# Patient Record
Sex: MALE | Race: WHITE | Employment: UNEMPLOYED | URBAN - NONMETROPOLITAN AREA
[De-identification: names, ages, dates, MRNs, and addresses within clinical notes are randomized per-mention and may not be internally consistent; named-entity substitution may affect disease eponyms.]

---

## 2018-02-19 ENCOUNTER — HOSPITAL ENCOUNTER (EMERGENCY)
Age: 28
Discharge: HOME | End: 2018-02-19
Attending: FAMILY MEDICINE

## 2018-02-19 VITALS
WEIGHT: 260 LBS | RESPIRATION RATE: 16 BRPM | TEMPERATURE: 98.8 F | SYSTOLIC BLOOD PRESSURE: 126 MMHG | DIASTOLIC BLOOD PRESSURE: 80 MMHG | OXYGEN SATURATION: 98 % | HEART RATE: 75 BPM | BODY MASS INDEX: 36.26 KG/M2

## 2018-02-19 DIAGNOSIS — K52.9 ENTERITIS: Primary | ICD-10-CM

## 2018-02-19 DIAGNOSIS — R63.8 SYMPTOMS OF DEHYDRATION: ICD-10-CM

## 2018-02-19 LAB
-: ABNORMAL
ABSOLUTE EOS #: 0.2 K/UL (ref 0–0.4)
ABSOLUTE IMMATURE GRANULOCYTE: ABNORMAL K/UL (ref 0–0.3)
ABSOLUTE LYMPH #: 3.7 K/UL (ref 1–4.8)
ABSOLUTE MONO #: 0.4 K/UL (ref 0.2–0.8)
ALBUMIN SERPL-MCNC: 4.4 G/DL (ref 3.5–5.2)
ALBUMIN/GLOBULIN RATIO: 1.6 (ref 1–2.5)
ALP BLD-CCNC: 64 U/L (ref 40–129)
ALT SERPL-CCNC: 34 U/L (ref 5–41)
AMORPHOUS: ABNORMAL
ANION GAP SERPL CALCULATED.3IONS-SCNC: 9 MMOL/L (ref 9–17)
AST SERPL-CCNC: 21 U/L
BACTERIA: ABNORMAL
BASOPHILS # BLD: 1 % (ref 0–2)
BASOPHILS ABSOLUTE: 0.1 K/UL (ref 0–0.2)
BILIRUB SERPL-MCNC: 0.33 MG/DL (ref 0.3–1.2)
BILIRUBIN URINE: NEGATIVE
BUN BLDV-MCNC: 13 MG/DL (ref 6–20)
BUN/CREAT BLD: 14 (ref 9–20)
CALCIUM SERPL-MCNC: 9.3 MG/DL (ref 8.6–10.4)
CASTS UA: ABNORMAL /LPF
CHLORIDE BLD-SCNC: 103 MMOL/L (ref 98–107)
CO2: 28 MMOL/L (ref 20–31)
COLOR: YELLOW
COMMENT UA: ABNORMAL
CREAT SERPL-MCNC: 0.91 MG/DL (ref 0.7–1.2)
CRYSTALS, UA: ABNORMAL /HPF
DIFFERENTIAL TYPE: ABNORMAL
EOSINOPHILS RELATIVE PERCENT: 3 % (ref 1–4)
EPITHELIAL CELLS UA: ABNORMAL /HPF (ref 0–5)
GFR AFRICAN AMERICAN: >60 ML/MIN
GFR NON-AFRICAN AMERICAN: >60 ML/MIN
GFR SERPL CREATININE-BSD FRML MDRD: ABNORMAL ML/MIN/{1.73_M2}
GFR SERPL CREATININE-BSD FRML MDRD: ABNORMAL ML/MIN/{1.73_M2}
GLUCOSE BLD-MCNC: 106 MG/DL (ref 70–99)
GLUCOSE URINE: NEGATIVE
HCT VFR BLD CALC: 47.5 % (ref 41–53)
HEMOGLOBIN: 16.7 G/DL (ref 13.5–17)
IMMATURE GRANULOCYTES: ABNORMAL %
KETONES, URINE: NEGATIVE
LEUKOCYTE ESTERASE, URINE: NEGATIVE
LIPASE: 34 U/L (ref 13–60)
LYMPHOCYTES # BLD: 47 % (ref 24–44)
MCH RBC QN AUTO: 28.8 PG (ref 26–34)
MCHC RBC AUTO-ENTMCNC: 35.2 G/DL (ref 31–37)
MCV RBC AUTO: 81.8 FL (ref 80–100)
MONOCYTES # BLD: 5 % (ref 1–7)
MUCUS: ABNORMAL
NITRITE, URINE: NEGATIVE
NRBC AUTOMATED: ABNORMAL PER 100 WBC
OTHER OBSERVATIONS UA: ABNORMAL
PDW BLD-RTO: 12.2 % (ref 12.1–15.2)
PH UA: 6 (ref 5–9)
PLATELET # BLD: 269 K/UL (ref 140–450)
PLATELET ESTIMATE: ABNORMAL
PMV BLD AUTO: 8.2 FL (ref 6–12)
POTASSIUM SERPL-SCNC: 4.5 MMOL/L (ref 3.7–5.3)
PROTEIN UA: NEGATIVE
RBC # BLD: 5.81 M/UL (ref 4.5–5.9)
RBC # BLD: ABNORMAL 10*6/UL
RBC UA: ABNORMAL /HPF (ref 0–2)
RENAL EPITHELIAL, UA: ABNORMAL /HPF
SEG NEUTROPHILS: 44 % (ref 36–66)
SEGMENTED NEUTROPHILS ABSOLUTE COUNT: 3.5 K/UL (ref 1.8–7.7)
SODIUM BLD-SCNC: 140 MMOL/L (ref 135–144)
SPECIFIC GRAVITY UA: 1.02 (ref 1.01–1.02)
TOTAL PROTEIN: 7.2 G/DL (ref 6.4–8.3)
TRICHOMONAS: ABNORMAL
TURBIDITY: CLEAR
URINE HGB: NEGATIVE
UROBILINOGEN, URINE: NORMAL
WBC # BLD: 7.9 K/UL (ref 3.5–11)
WBC # BLD: ABNORMAL 10*3/UL
WBC UA: ABNORMAL /HPF (ref 0–5)
YEAST: ABNORMAL

## 2018-02-19 PROCEDURE — 80053 COMPREHEN METABOLIC PANEL: CPT

## 2018-02-19 PROCEDURE — 83690 ASSAY OF LIPASE: CPT

## 2018-02-19 PROCEDURE — 99284 EMERGENCY DEPT VISIT MOD MDM: CPT

## 2018-02-19 PROCEDURE — 36415 COLL VENOUS BLD VENIPUNCTURE: CPT

## 2018-02-19 PROCEDURE — 96360 HYDRATION IV INFUSION INIT: CPT

## 2018-02-19 PROCEDURE — 87086 URINE CULTURE/COLONY COUNT: CPT

## 2018-02-19 PROCEDURE — 85025 COMPLETE CBC W/AUTO DIFF WBC: CPT

## 2018-02-19 PROCEDURE — 2580000003 HC RX 258: Performed by: FAMILY MEDICINE

## 2018-02-19 PROCEDURE — 81001 URINALYSIS AUTO W/SCOPE: CPT

## 2018-02-19 RX ORDER — DICYCLOMINE HYDROCHLORIDE 10 MG/1
10 CAPSULE ORAL EVERY 4 HOURS PRN
Qty: 30 CAPSULE | Refills: 1 | Status: SHIPPED | OUTPATIENT
Start: 2018-02-19 | End: 2019-06-14 | Stop reason: ALTCHOICE

## 2018-02-19 RX ORDER — 0.9 % SODIUM CHLORIDE 0.9 %
1000 INTRAVENOUS SOLUTION INTRAVENOUS ONCE
Status: COMPLETED | OUTPATIENT
Start: 2018-02-19 | End: 2018-02-19

## 2018-02-19 RX ORDER — METRONIDAZOLE 500 MG/1
500 TABLET ORAL 3 TIMES DAILY
Qty: 30 TABLET | Refills: 0 | Status: SHIPPED | OUTPATIENT
Start: 2018-02-19 | End: 2018-03-01

## 2018-02-19 RX ADMIN — SODIUM CHLORIDE 1000 ML: 9 INJECTION, SOLUTION INTRAVENOUS at 08:17

## 2018-02-19 NOTE — ED PROVIDER NOTES
time. Patient appears well-developed and well-nourished. Patient is active and cooperative. HENT:   Head: Normocephalic and atraumatic. Head is without contusion. Right Ear: Hearing and external ear normal. No drainage. Left Ear: Hearing and external ear normal. No drainage. Nose: Nose normal. No nasal deformity. No epistaxis. Mouth/Throat: Mucous membranes are slight dry. Eyes: EOMI. Conjunctivae, sclera, and lids are normal. Right eye exhibits no discharge. Left eye exhibits no discharge. Neck: Full passive range of motion without pain and phonation normal.   Cardiovascular:  Normal rate, regular rhythm and intact distal pulses. Pulses: Right radial pulse  2+   Pulmonary/Chest: Effort normal. No tachypnea and no bradypnea. Abdominal: Soft. Patient without distension , mild tenderness to palpation very lower aspects of the abdomen and greater on the right, without rigidity rebound or guarding. Musculoskeletal:   Negative acute trauma or deformity,  apparent full range of motion and normal strength all extremities appropriate to age. Neurological: Patient is alert and oriented to person, place, and time. patient displays no tremor. Patient displays no seizure activity. .  Skin: Skin is warm and dry. Patient is not diaphoretic. Psychiatric: Patient has a normal mood and affect.  Patient speech is normal and behavior is normal. Cognition and memory are normal.      DIFFERENTIAL DIAGNOSIS:   Gastritis, enteritis, AGE, LAUREL    DIAGNOSTIC RESULTS           RADIOLOGY: non-plain film images(s) such as CT, Ultrasound and MRI are read by the radiologist.  No orders to display       LABS:   Labs Reviewed   CBC WITH AUTO DIFFERENTIAL - Abnormal; Notable for the following:        Result Value    Lymphocytes 47 (*)     All other components within normal limits   COMPREHENSIVE METABOLIC PANEL - Abnormal; Notable for the following:     Glucose 106 (*)     All other components within normal limits   URINALYSIS

## 2018-02-20 LAB
CULTURE: NO GROWTH
CULTURE: NORMAL
Lab: NORMAL
SPECIMEN DESCRIPTION: NORMAL
SPECIMEN DESCRIPTION: NORMAL
STATUS: NORMAL

## 2019-06-14 ENCOUNTER — HOSPITAL ENCOUNTER (EMERGENCY)
Age: 29
Discharge: HOME | End: 2019-06-14

## 2019-06-14 VITALS
OXYGEN SATURATION: 96 % | DIASTOLIC BLOOD PRESSURE: 89 MMHG | RESPIRATION RATE: 18 BRPM | TEMPERATURE: 98.9 F | SYSTOLIC BLOOD PRESSURE: 150 MMHG | HEART RATE: 94 BPM

## 2019-06-14 DIAGNOSIS — T15.91XA FOREIGN BODY OF RIGHT EYE, INITIAL ENCOUNTER: Primary | ICD-10-CM

## 2019-06-14 DIAGNOSIS — S05.01XA ABRASION OF RIGHT CORNEA, INITIAL ENCOUNTER: ICD-10-CM

## 2019-06-14 PROCEDURE — 99283 EMERGENCY DEPT VISIT LOW MDM: CPT

## 2019-06-14 PROCEDURE — 65205 REMOVE FOREIGN BODY FROM EYE: CPT

## 2019-06-14 RX ORDER — SULFACETAMIDE SODIUM 100 MG/ML
2 SOLUTION/ DROPS OPHTHALMIC EVERY 4 HOURS
Qty: 5 ML | Refills: 0 | Status: SHIPPED | OUTPATIENT
Start: 2019-06-14 | End: 2019-06-19

## 2019-06-14 RX ORDER — TETRACAINE HYDROCHLORIDE 5 MG/ML
1 SOLUTION OPHTHALMIC ONCE
Status: DISCONTINUED | OUTPATIENT
Start: 2019-06-14 | End: 2019-06-14 | Stop reason: HOSPADM

## 2019-06-14 ASSESSMENT — PAIN SCALES - GENERAL: PAINLEVEL_OUTOF10: 5

## 2019-06-14 ASSESSMENT — PAIN DESCRIPTION - ORIENTATION: ORIENTATION: RIGHT

## 2019-06-14 ASSESSMENT — PAIN DESCRIPTION - PAIN TYPE: TYPE: ACUTE PAIN

## 2019-06-14 ASSESSMENT — PAIN DESCRIPTION - LOCATION: LOCATION: EYE

## 2019-06-14 NOTE — ED PROVIDER NOTES
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 male who presents to the emergency department from work stating that 2 hours prior to arrival he was working underneath a truck and something got in his right eye. He was wearing contact lenses which he removed. He denies visual changes but feels like there is something in his right eye      Triage notes and Nursing notes were reviewed by myself. Any discrepancies are addressed above. PAST MEDICAL HISTORY   History reviewed. No pertinent past medical history. SURGICAL HISTORY       Past Surgical History:   Procedure Laterality Date    HERNIA REPAIR         CURRENT MEDICATIONS       Previous Medications    No medications on file       ALLERGIES     Patient has no known allergies. FAMILY HISTORY     History reviewed. No pertinent family history. SOCIAL HISTORY       Social History     Socioeconomic History    Marital status: Single     Spouse name: None    Number of children: None    Years of education: None    Highest education level: None   Occupational History    None   Social Needs    Financial resource strain: None    Food insecurity:     Worry: None     Inability: None    Transportation needs:     Medical: None     Non-medical: None   Tobacco Use    Smoking status: Former Smoker     Packs/day: 0.50     Years: 5.00     Pack years: 2.50     Types: Cigarettes    Smokeless tobacco: Never Used   Substance and Sexual Activity    Alcohol use:  Yes     Alcohol/week: 0.6 oz     Types: 1 Cans of beer per week    Drug use: No    Sexual activity: None   Lifestyle    Physical activity:     Days per week: None     Minutes per session: None    Stress: None   Relationships    Social connections:     Talks on phone: None     Gets together: None     Attends [employer] service: None     Active member of club or organization: None     Attends meetings of clubs or organizations: None     Relationship status: None    Intimate partner violence:     Fear of current or ex partner: None     Emotionally abused: None     Physically abused: None     Forced sexual activity: None   Other Topics Concern    None   Social History Narrative    None       REVIEW OF SYSTEMS     Review of Systems    Except as noted above the remainder of the review of systems was reviewed and is negative. SCREENINGS           PHYSICAL EXAM    (up to 7 for level 4, 8 or more for level 5)     ED Triage Vitals [06/14/19 1330]   BP Temp Temp Source Pulse Resp SpO2 Height Weight   (!) 150/89 98.9 °F (37.2 °C) Oral 94 18 96 % -- --       Physical Exam  Active and oriented ×3. Nontoxic. No acute distress. Well-hydrated. Head is atraumatic, facies symmetrical.  Pupils equal round reactive light extract movements intact anterior chambers clear bilaterally. Right eyelid everted small black foreign body removed with sterile swab. Fluorescein dye shows an area of central uptake on the right cornea this is eczema with slit-lamp and does reveal abrasion without foreign body centrally on the right cornea. Respirations nonlabored. Skin free of any obvious rashes or lesions. Extremities without edema. Good affect. Pleasant patient. DIAGNOSTIC RESULTS     none    EMERGENCY DEPARTMENT COURSE andMedical Decision Making:     Vitals:    Vitals:    06/14/19 1330   BP: (!) 150/89   Pulse: 94   Resp: 18   Temp: 98.9 °F (37.2 °C)   TempSrc: Oral   SpO2: 96%       MDM/     I discussed with the patient the fact that he should not use his contact lenses until he follows up with his eye doctor and is told that he can start wearing his contact lenses again.